# Patient Record
Sex: MALE | Race: OTHER | HISPANIC OR LATINO | URBAN - METROPOLITAN AREA
[De-identification: names, ages, dates, MRNs, and addresses within clinical notes are randomized per-mention and may not be internally consistent; named-entity substitution may affect disease eponyms.]

---

## 2023-07-31 ENCOUNTER — EMERGENCY (EMERGENCY)
Facility: HOSPITAL | Age: 43
LOS: 1 days | Discharge: HOME | End: 2023-07-31
Attending: EMERGENCY MEDICINE
Payer: SELF-PAY

## 2023-07-31 VITALS
TEMPERATURE: 98 F | SYSTOLIC BLOOD PRESSURE: 115 MMHG | OXYGEN SATURATION: 96 % | RESPIRATION RATE: 20 BRPM | HEART RATE: 107 BPM | DIASTOLIC BLOOD PRESSURE: 67 MMHG

## 2023-07-31 VITALS
DIASTOLIC BLOOD PRESSURE: 83 MMHG | HEART RATE: 111 BPM | RESPIRATION RATE: 17 BRPM | OXYGEN SATURATION: 95 % | SYSTOLIC BLOOD PRESSURE: 121 MMHG | TEMPERATURE: 98 F

## 2023-07-31 PROCEDURE — 99284 EMERGENCY DEPT VISIT MOD MDM: CPT

## 2023-07-31 PROCEDURE — T1013: CPT

## 2023-07-31 PROCEDURE — 96372 THER/PROPH/DIAG INJ SC/IM: CPT

## 2023-07-31 PROCEDURE — 99285 EMERGENCY DEPT VISIT HI MDM: CPT

## 2023-07-31 RX ORDER — HALOPERIDOL DECANOATE 100 MG/ML
5 INJECTION INTRAMUSCULAR ONCE
Refills: 0 | Status: COMPLETED | OUTPATIENT
Start: 2023-07-31 | End: 2023-07-31

## 2023-07-31 RX ADMIN — HALOPERIDOL DECANOATE 5 MILLIGRAM(S): 100 INJECTION INTRAMUSCULAR at 13:35

## 2023-07-31 RX ADMIN — Medication 2 MILLIGRAM(S): at 13:35

## 2023-07-31 NOTE — ED PROVIDER NOTE - PROGRESS NOTE DETAILS
Buster: Patient signed out to me by previous team. Patient evaluated bedside and is comfortable w/o concern at this time. Vitals stable. Pending sobriety. Will reassess. Mike: reassessed; awake and alert, calm and cooperative, ambulatory without assistance, requesting d/c

## 2023-07-31 NOTE — ED PROVIDER NOTE - PHYSICAL EXAMINATION
Gen: Well appearing in NAD,  smells strongly of alcohol.  Head: NC/AT  Neck: trachea midline  Resp:  No distress  Ext: no deformities  Neuro:  A&O appears non focal,   Very unsteady on feet.  Skin:  Warm and dry as visualized  Psych:  Normal affect and mood

## 2023-07-31 NOTE — ED PROVIDER NOTE - PATIENT PORTAL LINK FT
You can access the FollowMyHealth Patient Portal offered by [facility] by registering at the following website: [email or web address]. By joining [facility]’s FollowMyHealth portal, you will also be able to view your health information using other applications (apps) compatible with our system.

## 2023-07-31 NOTE — ED ADULT NURSE NOTE - OBJECTIVE STATEMENT
pt alert and oriented x2 in yellow gown. belongings secured pt agitated, climbing out of bed, unsteady gate. alcoohl like odor on breathe noted

## 2023-07-31 NOTE — ED ADULT NURSE NOTE - NSFALLRISKINTERV_ED_ALL_ED
Assistance OOB with selected safe patient handling equipment if applicable/Assistance with ambulation/Communicate fall risk and risk factors to all staff, patient, and family/Monitor gait and stability/Monitor for mental status changes and reorient to person, place, and time, as needed/Move patient closer to nursing station/within visual sight of ED staff/Provide visual cue: yellow wristband, yellow gown, etc/Reinforce activity limits and safety measures with patient and family/Toileting schedule using arm’s reach rule for commode and bathroom/Use of alarms - bed, stretcher, chair and/or video monitoring/Call bell, personal items and telephone in reach/Instruct patient to call for assistance before getting out of bed/chair/stretcher/Non-slip footwear applied when patient is off stretcher/[place] to call system/Physically safe environment - no spills, clutter or unnecessary equipment/Purposeful Proactive Rounding/Room/bathroom lighting operational, light cord in reach

## 2023-07-31 NOTE — ED ADULT NURSE REASSESSMENT NOTE - NS ED NURSE REASSESS COMMENT FT1
Received pt from Sujata ADKINS pt resting quietly in stretcher respirations even and unlabored. NAD noted at this time. No new orders at this time. Pt safety maintained.

## 2023-07-31 NOTE — ED PROVIDER NOTE - CLINICAL SUMMARY MEDICAL DECISION MAKING FREE TEXT BOX
Patient with history of alcohol abuse presents for alcohol intoxication.  Patient agitated, unable to be verbally redirected, requiring IM medication for disorganization and agitation.  No outward signs of trauma, will monitor for sobriety.  Fall risk in place.

## 2023-07-31 NOTE — ED PROVIDER NOTE - OBJECTIVE STATEMENT
43-year-old male with past medical history alcohol abuse presents for alcohol intoxication.  Patient too intoxicated to provide any history.  Trying to get up out of bed, asking to use the bathroom.  Patient very unsteady on feet.  Patient placed back in bed by myself and nurse.  Patient given urinal.  Patient proceeded to urinate all of the floor.  Patient refusing to stay in bed.  Patient requiring IM medication for disorganization and agitation.